# Patient Record
Sex: MALE | Race: WHITE | NOT HISPANIC OR LATINO | Employment: OTHER | URBAN - METROPOLITAN AREA
[De-identification: names, ages, dates, MRNs, and addresses within clinical notes are randomized per-mention and may not be internally consistent; named-entity substitution may affect disease eponyms.]

---

## 2023-12-11 ENCOUNTER — HOSPITAL ENCOUNTER (OUTPATIENT)
Facility: HOSPITAL | Age: 78
Setting detail: OBSERVATION
Discharge: HOME | End: 2023-12-12
Attending: INTERNAL MEDICINE | Admitting: STUDENT IN AN ORGANIZED HEALTH CARE EDUCATION/TRAINING PROGRAM
Payer: MEDICARE

## 2023-12-11 ENCOUNTER — APPOINTMENT (OUTPATIENT)
Dept: RADIOLOGY | Facility: HOSPITAL | Age: 78
End: 2023-12-11
Payer: MEDICARE

## 2023-12-11 DIAGNOSIS — R06.00 ACUTE DYSPNEA: ICD-10-CM

## 2023-12-11 DIAGNOSIS — R42 DIZZINESS: Primary | ICD-10-CM

## 2023-12-11 DIAGNOSIS — R07.9 CHEST PAIN, UNSPECIFIED TYPE: ICD-10-CM

## 2023-12-11 DIAGNOSIS — R55 NEAR SYNCOPE: ICD-10-CM

## 2023-12-11 LAB
ALBUMIN SERPL BCP-MCNC: 4.1 G/DL (ref 3.4–5)
ALP SERPL-CCNC: 122 U/L (ref 33–136)
ALT SERPL W P-5'-P-CCNC: 19 U/L (ref 10–52)
ANION GAP SERPL CALC-SCNC: 14 MMOL/L (ref 10–20)
AST SERPL W P-5'-P-CCNC: 19 U/L (ref 9–39)
BASOPHILS # BLD MANUAL: 0 X10*3/UL (ref 0–0.1)
BASOPHILS NFR BLD MANUAL: 0 %
BILIRUB SERPL-MCNC: 0.6 MG/DL (ref 0–1.2)
BNP SERPL-MCNC: 35 PG/ML (ref 0–99)
BUN SERPL-MCNC: 21 MG/DL (ref 6–23)
CALCIUM SERPL-MCNC: 8.8 MG/DL (ref 8.6–10.3)
CARDIAC TROPONIN I PNL SERPL HS: 4 NG/L (ref 0–20)
CARDIAC TROPONIN I PNL SERPL HS: 5 NG/L (ref 0–20)
CHLORIDE SERPL-SCNC: 103 MMOL/L (ref 98–107)
CO2 SERPL-SCNC: 21 MMOL/L (ref 21–32)
CREAT SERPL-MCNC: 0.92 MG/DL (ref 0.5–1.3)
EOSINOPHIL # BLD MANUAL: 0.1 X10*3/UL (ref 0–0.4)
EOSINOPHIL NFR BLD MANUAL: 2 %
ERYTHROCYTE [DISTWIDTH] IN BLOOD BY AUTOMATED COUNT: 13.8 % (ref 11.5–14.5)
GFR SERPL CREATININE-BSD FRML MDRD: 85 ML/MIN/1.73M*2
GLUCOSE SERPL-MCNC: 168 MG/DL (ref 74–99)
HCT VFR BLD AUTO: 39.1 % (ref 41–52)
HGB BLD-MCNC: 13.1 G/DL (ref 13.5–17.5)
IMM GRANULOCYTES # BLD AUTO: 0.02 X10*3/UL (ref 0–0.5)
IMM GRANULOCYTES NFR BLD AUTO: 0.4 % (ref 0–0.9)
LYMPHOCYTES # BLD MANUAL: 1.56 X10*3/UL (ref 0.8–3)
LYMPHOCYTES NFR BLD MANUAL: 30 %
MCH RBC QN AUTO: 30.9 PG (ref 26–34)
MCHC RBC AUTO-ENTMCNC: 33.5 G/DL (ref 32–36)
MCV RBC AUTO: 92 FL (ref 80–100)
MONOCYTES # BLD MANUAL: 0.31 X10*3/UL (ref 0.05–0.8)
MONOCYTES NFR BLD MANUAL: 6 %
NEUTROPHILS # BLD MANUAL: 3.17 X10*3/UL (ref 1.6–5.5)
NEUTS BAND # BLD MANUAL: 0.21 X10*3/UL (ref 0–0.5)
NEUTS BAND NFR BLD MANUAL: 4 %
NEUTS SEG # BLD MANUAL: 2.96 X10*3/UL (ref 1.6–5)
NEUTS SEG NFR BLD MANUAL: 57 %
NRBC BLD-RTO: 0 /100 WBCS (ref 0–0)
PLATELET # BLD AUTO: 179 X10*3/UL (ref 150–450)
POTASSIUM SERPL-SCNC: 4.3 MMOL/L (ref 3.5–5.3)
PROT SERPL-MCNC: 7 G/DL (ref 6.4–8.2)
RBC # BLD AUTO: 4.24 X10*6/UL (ref 4.5–5.9)
RBC MORPH BLD: NORMAL
SODIUM SERPL-SCNC: 134 MMOL/L (ref 136–145)
TOTAL CELLS COUNTED BLD: 100
VARIANT LYMPHS # BLD MANUAL: 0.05 X10*3/UL (ref 0–0.3)
VARIANT LYMPHS NFR BLD: 1 %
WBC # BLD AUTO: 5.2 X10*3/UL (ref 4.4–11.3)

## 2023-12-11 PROCEDURE — 36415 COLL VENOUS BLD VENIPUNCTURE: CPT | Performed by: PHYSICIAN ASSISTANT

## 2023-12-11 PROCEDURE — 71045 X-RAY EXAM CHEST 1 VIEW: CPT | Mod: FOREIGN READ | Performed by: RADIOLOGY

## 2023-12-11 PROCEDURE — G0378 HOSPITAL OBSERVATION PER HR: HCPCS

## 2023-12-11 PROCEDURE — 85007 BL SMEAR W/DIFF WBC COUNT: CPT | Performed by: PHYSICIAN ASSISTANT

## 2023-12-11 PROCEDURE — 84484 ASSAY OF TROPONIN QUANT: CPT | Performed by: PHYSICIAN ASSISTANT

## 2023-12-11 PROCEDURE — 80053 COMPREHEN METABOLIC PANEL: CPT | Performed by: PHYSICIAN ASSISTANT

## 2023-12-11 PROCEDURE — 2500000001 HC RX 250 WO HCPCS SELF ADMINISTERED DRUGS (ALT 637 FOR MEDICARE OP): Performed by: STUDENT IN AN ORGANIZED HEALTH CARE EDUCATION/TRAINING PROGRAM

## 2023-12-11 PROCEDURE — 99285 EMERGENCY DEPT VISIT HI MDM: CPT | Performed by: INTERNAL MEDICINE

## 2023-12-11 PROCEDURE — 83880 ASSAY OF NATRIURETIC PEPTIDE: CPT | Performed by: INTERNAL MEDICINE

## 2023-12-11 PROCEDURE — 71045 X-RAY EXAM CHEST 1 VIEW: CPT | Mod: FY,FR

## 2023-12-11 PROCEDURE — 85027 COMPLETE CBC AUTOMATED: CPT | Performed by: PHYSICIAN ASSISTANT

## 2023-12-11 PROCEDURE — 99222 1ST HOSP IP/OBS MODERATE 55: CPT | Performed by: STUDENT IN AN ORGANIZED HEALTH CARE EDUCATION/TRAINING PROGRAM

## 2023-12-11 RX ORDER — ASPIRIN 81 MG/1
81 TABLET ORAL NIGHTLY
COMMUNITY
Start: 2020-04-28

## 2023-12-11 RX ORDER — KETOROLAC TROMETHAMINE 5 MG/ML
1 SOLUTION OPHTHALMIC 2 TIMES DAILY
Status: DISCONTINUED | OUTPATIENT
Start: 2023-12-11 | End: 2023-12-12 | Stop reason: HOSPADM

## 2023-12-11 RX ORDER — GABAPENTIN 100 MG/1
100 CAPSULE ORAL 2 TIMES DAILY
Status: DISCONTINUED | OUTPATIENT
Start: 2023-12-11 | End: 2023-12-12 | Stop reason: HOSPADM

## 2023-12-11 RX ORDER — TROSPIUM CHLORIDE 20 MG/1
20 TABLET, FILM COATED ORAL 2 TIMES DAILY
COMMUNITY
Start: 2023-12-04

## 2023-12-11 RX ORDER — ACETAMINOPHEN 325 MG/1
650 TABLET ORAL 2 TIMES DAILY PRN
Status: DISCONTINUED | OUTPATIENT
Start: 2023-12-11 | End: 2023-12-12 | Stop reason: HOSPADM

## 2023-12-11 RX ORDER — PREDNISOLONE ACETATE 10 MG/ML
1 SUSPENSION/ DROPS OPHTHALMIC DAILY
COMMUNITY
Start: 2023-11-28

## 2023-12-11 RX ORDER — ASPIRIN 81 MG/1
81 TABLET ORAL NIGHTLY
Status: DISCONTINUED | OUTPATIENT
Start: 2023-12-12 | End: 2023-12-12 | Stop reason: HOSPADM

## 2023-12-11 RX ORDER — ACETAMINOPHEN 500 MG
3 TABLET ORAL EVERY 12 HOURS
COMMUNITY
End: 2023-12-12 | Stop reason: HOSPADM

## 2023-12-11 RX ORDER — TROSPIUM CHLORIDE 20 MG/1
20 TABLET, FILM COATED ORAL 2 TIMES DAILY
Status: DISCONTINUED | OUTPATIENT
Start: 2023-12-11 | End: 2023-12-12 | Stop reason: CLARIF

## 2023-12-11 RX ORDER — MECLIZINE HCL 12.5 MG 12.5 MG/1
12.5 TABLET ORAL ONCE
Status: DISCONTINUED | OUTPATIENT
Start: 2023-12-11 | End: 2023-12-11

## 2023-12-11 RX ORDER — SENNOSIDES 8.6 MG/1
2 TABLET ORAL 2 TIMES DAILY
Status: DISCONTINUED | OUTPATIENT
Start: 2023-12-11 | End: 2023-12-12 | Stop reason: HOSPADM

## 2023-12-11 RX ORDER — POLYETHYLENE GLYCOL 3350 17 G/17G
17 POWDER, FOR SOLUTION ORAL DAILY
Status: DISCONTINUED | OUTPATIENT
Start: 2023-12-11 | End: 2023-12-12 | Stop reason: HOSPADM

## 2023-12-11 RX ORDER — ENOXAPARIN SODIUM 100 MG/ML
40 INJECTION SUBCUTANEOUS EVERY 24 HOURS
Status: DISCONTINUED | OUTPATIENT
Start: 2023-12-11 | End: 2023-12-12 | Stop reason: HOSPADM

## 2023-12-11 RX ORDER — PREDNISOLONE ACETATE 10 MG/ML
1 SUSPENSION/ DROPS OPHTHALMIC DAILY
Status: DISCONTINUED | OUTPATIENT
Start: 2023-12-11 | End: 2023-12-12 | Stop reason: HOSPADM

## 2023-12-11 RX ORDER — LANOLIN ALCOHOL/MO/W.PET/CERES
1000 CREAM (GRAM) TOPICAL DAILY
Status: DISCONTINUED | OUTPATIENT
Start: 2023-12-11 | End: 2023-12-12 | Stop reason: HOSPADM

## 2023-12-11 RX ORDER — TAMSULOSIN HYDROCHLORIDE 0.4 MG/1
0.4 CAPSULE ORAL DAILY
Status: DISCONTINUED | OUTPATIENT
Start: 2023-12-11 | End: 2023-12-12 | Stop reason: HOSPADM

## 2023-12-11 RX ORDER — GABAPENTIN 100 MG/1
1 CAPSULE ORAL 2 TIMES DAILY
COMMUNITY
Start: 2023-09-13 | End: 2024-01-01

## 2023-12-11 RX ORDER — PRAVASTATIN SODIUM 20 MG/1
20 TABLET ORAL NIGHTLY
Status: DISCONTINUED | OUTPATIENT
Start: 2023-12-11 | End: 2023-12-12 | Stop reason: HOSPADM

## 2023-12-11 RX ORDER — KETOROLAC TROMETHAMINE 5 MG/ML
1 SOLUTION OPHTHALMIC 2 TIMES DAILY
COMMUNITY
Start: 2023-11-28

## 2023-12-11 RX ORDER — TAMSULOSIN HYDROCHLORIDE 0.4 MG/1
0.4 CAPSULE ORAL DAILY
COMMUNITY
Start: 2023-02-22 | End: 2024-03-06

## 2023-12-11 RX ORDER — LANOLIN ALCOHOL/MO/W.PET/CERES
1000 CREAM (GRAM) TOPICAL DAILY
COMMUNITY
Start: 2022-07-07 | End: 2023-12-12 | Stop reason: HOSPADM

## 2023-12-11 RX ORDER — LOVASTATIN 20 MG/1
20 TABLET ORAL NIGHTLY
COMMUNITY
Start: 2015-06-29 | End: 2024-10-19

## 2023-12-11 RX ADMIN — PRAVASTATIN SODIUM 20 MG: 20 TABLET ORAL at 22:07

## 2023-12-11 RX ADMIN — GABAPENTIN 100 MG: 100 CAPSULE ORAL at 22:07

## 2023-12-11 SDOH — SOCIAL STABILITY: SOCIAL INSECURITY: DO YOU FEEL UNSAFE GOING BACK TO THE PLACE WHERE YOU ARE LIVING?: NO

## 2023-12-11 SDOH — SOCIAL STABILITY: SOCIAL INSECURITY: HAS ANYONE EVER THREATENED TO HURT YOUR FAMILY OR YOUR PETS?: NO

## 2023-12-11 SDOH — SOCIAL STABILITY: SOCIAL INSECURITY: HAVE YOU HAD THOUGHTS OF HARMING ANYONE ELSE?: NO

## 2023-12-11 SDOH — SOCIAL STABILITY: SOCIAL INSECURITY: DO YOU FEEL ANYONE HAS EXPLOITED OR TAKEN ADVANTAGE OF YOU FINANCIALLY OR OF YOUR PERSONAL PROPERTY?: NO

## 2023-12-11 SDOH — SOCIAL STABILITY: SOCIAL INSECURITY: ABUSE: ADULT

## 2023-12-11 SDOH — SOCIAL STABILITY: SOCIAL INSECURITY: ARE YOU OR HAVE YOU BEEN THREATENED OR ABUSED PHYSICALLY, EMOTIONALLY, OR SEXUALLY BY ANYONE?: NO

## 2023-12-11 SDOH — SOCIAL STABILITY: SOCIAL INSECURITY: ARE THERE ANY APPARENT SIGNS OF INJURIES/BEHAVIORS THAT COULD BE RELATED TO ABUSE/NEGLECT?: NO

## 2023-12-11 SDOH — SOCIAL STABILITY: SOCIAL INSECURITY: DOES ANYONE TRY TO KEEP YOU FROM HAVING/CONTACTING OTHER FRIENDS OR DOING THINGS OUTSIDE YOUR HOME?: NO

## 2023-12-11 SDOH — SOCIAL STABILITY: SOCIAL INSECURITY: WERE YOU ABLE TO COMPLETE ALL THE BEHAVIORAL HEALTH SCREENINGS?: YES

## 2023-12-11 ASSESSMENT — ACTIVITIES OF DAILY LIVING (ADL)
DRESSING YOURSELF: INDEPENDENT
PATIENT'S MEMORY ADEQUATE TO SAFELY COMPLETE DAILY ACTIVITIES?: YES
ADEQUATE_TO_COMPLETE_ADL: YES
LACK_OF_TRANSPORTATION: NO
BATHING: INDEPENDENT
GROOMING: INDEPENDENT
WALKS IN HOME: INDEPENDENT
HEARING - LEFT EAR: FUNCTIONAL
FEEDING YOURSELF: INDEPENDENT
JUDGMENT_ADEQUATE_SAFELY_COMPLETE_DAILY_ACTIVITIES: YES
TOILETING: INDEPENDENT
HEARING - RIGHT EAR: FUNCTIONAL

## 2023-12-11 ASSESSMENT — LIFESTYLE VARIABLES
HOW OFTEN DO YOU HAVE 6 OR MORE DRINKS ON ONE OCCASION: NEVER
HOW MANY STANDARD DRINKS CONTAINING ALCOHOL DO YOU HAVE ON A TYPICAL DAY: PATIENT DOES NOT DRINK
HOW MANY STANDARD DRINKS CONTAINING ALCOHOL DO YOU HAVE ON A TYPICAL DAY: PATIENT DOES NOT DRINK
AUDIT-C TOTAL SCORE: 0
AUDIT-C TOTAL SCORE: 0
HAVE YOU EVER FELT YOU SHOULD CUT DOWN ON YOUR DRINKING: NO
SKIP TO QUESTIONS 9-10: 1
AUDIT-C TOTAL SCORE: 0
SKIP TO QUESTIONS 9-10: 1
HOW OFTEN DO YOU HAVE A DRINK CONTAINING ALCOHOL: NEVER
EVER HAD A DRINK FIRST THING IN THE MORNING TO STEADY YOUR NERVES TO GET RID OF A HANGOVER: NO
HOW OFTEN DO YOU HAVE A DRINK CONTAINING ALCOHOL: NEVER
SUBSTANCE_ABUSE_PAST_12_MONTHS: NO
REASON UNABLE TO ASSESS: NO
HOW OFTEN DO YOU HAVE 6 OR MORE DRINKS ON ONE OCCASION: NEVER
PRESCIPTION_ABUSE_PAST_12_MONTHS: NO
AUDIT-C TOTAL SCORE: 0
HAVE PEOPLE ANNOYED YOU BY CRITICIZING YOUR DRINKING: NO
EVER FELT BAD OR GUILTY ABOUT YOUR DRINKING: NO

## 2023-12-11 ASSESSMENT — COGNITIVE AND FUNCTIONAL STATUS - GENERAL
DAILY ACTIVITIY SCORE: 24
MOVING TO AND FROM BED TO CHAIR: A LITTLE
DAILY ACTIVITIY SCORE: 24
CLIMB 3 TO 5 STEPS WITH RAILING: A LITTLE
MOBILITY SCORE: 21
WALKING IN HOSPITAL ROOM: A LITTLE
MOBILITY SCORE: 24
PATIENT BASELINE BEDBOUND: NO

## 2023-12-11 ASSESSMENT — PATIENT HEALTH QUESTIONNAIRE - PHQ9
SUM OF ALL RESPONSES TO PHQ9 QUESTIONS 1 & 2: 0
1. LITTLE INTEREST OR PLEASURE IN DOING THINGS: NOT AT ALL
2. FEELING DOWN, DEPRESSED OR HOPELESS: NOT AT ALL
SUM OF ALL RESPONSES TO PHQ9 QUESTIONS 1 & 2: 0
2. FEELING DOWN, DEPRESSED OR HOPELESS: NOT AT ALL
1. LITTLE INTEREST OR PLEASURE IN DOING THINGS: NOT AT ALL

## 2023-12-11 ASSESSMENT — PAIN - FUNCTIONAL ASSESSMENT
PAIN_FUNCTIONAL_ASSESSMENT: 0-10
PAIN_FUNCTIONAL_ASSESSMENT: 0-10

## 2023-12-11 ASSESSMENT — COLUMBIA-SUICIDE SEVERITY RATING SCALE - C-SSRS
1. IN THE PAST MONTH, HAVE YOU WISHED YOU WERE DEAD OR WISHED YOU COULD GO TO SLEEP AND NOT WAKE UP?: NO
6. HAVE YOU EVER DONE ANYTHING, STARTED TO DO ANYTHING, OR PREPARED TO DO ANYTHING TO END YOUR LIFE?: NO
2. HAVE YOU ACTUALLY HAD ANY THOUGHTS OF KILLING YOURSELF?: NO
6. HAVE YOU EVER DONE ANYTHING, STARTED TO DO ANYTHING, OR PREPARED TO DO ANYTHING TO END YOUR LIFE?: NO
1. IN THE PAST MONTH, HAVE YOU WISHED YOU WERE DEAD OR WISHED YOU COULD GO TO SLEEP AND NOT WAKE UP?: NO
2. HAVE YOU ACTUALLY HAD ANY THOUGHTS OF KILLING YOURSELF?: NO

## 2023-12-11 ASSESSMENT — PAIN SCALES - GENERAL
PAINLEVEL_OUTOF10: 0 - NO PAIN
PAINLEVEL_OUTOF10: 0 - NO PAIN

## 2023-12-11 NOTE — ED PROVIDER NOTES
"Limitations to History: none  External Records Reviewed  Independent Historians: none  Social determinants affecting care: none    HPI  Abhishek Mckinley is a 78 y.o. male history of hypertension, hyperlipidemia, diabetes mellitus, neuropathy, who presents to the emergency department for assessment of dizziness today.  He reports that he was shoveling snow this morning and he became short of breath, lightheaded/dizzy, and nauseated.  He reports that he came into the house and try to eat an apple to see if you feel little bit better and he never did.  He denies any chest pains.  He reports that he felt like he was going to pass out and he also felt like the room was spinning around him.  He denies any double or blurred vision.  He denies any new upper or lower extremity numbness, tingling, weakness.  He reports that he is currently on antibiotics for a wound.  He denies any changes to bowel movements or urination.  He has no further complaints.    PMH  No past medical history on file. reviewed by myself.    Meds  Current Outpatient Medications   Medication Instructions    acetaminophen (Tylenol) 500 mg tablet 3 tablets, oral, Every 12 hours    aspirin 81 mg, oral, Nightly    cyanocobalamin (VITAMIN B-12) 1,000 mcg, oral, Daily    gabapentin (Neurontin) 100 mg capsule 1 capsule, oral, 2 times daily    ketorolac (Acular) 0.5 % ophthalmic solution 1 drop, Left Eye, 2 times daily    lovastatin (MEVACOR) 20 mg, oral, Nightly    prednisoLONE acetate (Pred-Forte) 1 % ophthalmic suspension 1 drop, Left Eye, Daily    tamsulosin (FLOMAX) 0.4 mg, oral, Daily    trospium (SANCTURA) 20 mg, oral, 2 times daily       Allergies  No Known Allergies reviewed by myself.    SHx    reviewed by myself.      ------------------------------------------------------------------------------------------------------------------------------------------    BP (!) 153/115   Pulse 78   Temp 36.4 °C (97.5 °F)   Resp 17   Ht 1.651 m (5' 5\")   Wt 74.8 " kg (165 lb)   SpO2 94%   BMI 27.46 kg/m²     Physical Exam  Vitals and nursing note reviewed.   Constitutional:       General: He is not in acute distress.     Appearance: Normal appearance. He is normal weight. He is not ill-appearing or toxic-appearing.   HENT:      Head: Normocephalic.      Nose: Nose normal.      Mouth/Throat:      Mouth: Mucous membranes are moist.   Eyes:      Extraocular Movements: Extraocular movements intact.      Conjunctiva/sclera: Conjunctivae normal.   Cardiovascular:      Rate and Rhythm: Normal rate and regular rhythm.      Pulses:           Radial pulses are 2+ on the right side and 2+ on the left side.        Dorsalis pedis pulses are 2+ on the right side and 2+ on the left side.   Pulmonary:      Effort: Pulmonary effort is normal.      Breath sounds: Normal breath sounds.   Abdominal:      General: Abdomen is flat. Bowel sounds are normal.      Palpations: Abdomen is soft.      Tenderness: There is no abdominal tenderness.   Musculoskeletal:         General: Normal range of motion.      Cervical back: Neck supple.      Right lower leg: No edema.      Left lower leg: No edema.   Skin:     General: Skin is warm and dry.   Neurological:      General: No focal deficit present.      Mental Status: He is alert and oriented to person, place, and time.      Cranial Nerves: Cranial nerves 2-12 are intact.      Sensory: Sensation is intact.      Motor: Motor function is intact.      Coordination: Coordination is intact.      Comments: NIH is 0.   Psychiatric:         Attention and Perception: Attention normal.         Mood and Affect: Mood normal.          ------------------------------------------------------------------------------------------------------------------------------------------  Imaging  XR chest 1 view   Final Result   No regions of airspace consolidation.   Signed by Tonny Bauer MD           Labs  Labs Reviewed   CBC WITH AUTO DIFFERENTIAL - Abnormal       Result Value     WBC 5.2      nRBC 0.0      RBC 4.24 (*)     Hemoglobin 13.1 (*)     Hematocrit 39.1 (*)     MCV 92      MCH 30.9      MCHC 33.5      RDW 13.8      Platelets 179      Immature Granulocytes %, Automated 0.4      Immature Granulocytes Absolute, Automated 0.02      Narrative:     The previously reported component Neutrophils % is no longer being reported.  The previously reported component Lymphocytes % is no longer being reported.  The previously reported component Monocytes % is no longer being reported.  The previously   reported component Eosinophils % is no longer being reported.  The previously reported component Basophils % is no longer being reported.  The previously reported component Absolute Neutrophils is no longer being reported.  The previously reported   component Absolute Lymphocytes is no longer being reported.  The previously reported component Absolute Monocytes is no longer being reported.  The previously reported component Absolute Eosinophils is no longer being reported.  The previously reported   component Absolute Basophils is no longer being reported.   COMPREHENSIVE METABOLIC PANEL - Abnormal    Glucose 168 (*)     Sodium 134 (*)     Potassium 4.3      Chloride 103      Bicarbonate 21      Anion Gap 14      Urea Nitrogen 21      Creatinine 0.92      eGFR 85      Calcium 8.8      Albumin 4.1      Alkaline Phosphatase 122      Total Protein 7.0      AST 19      Bilirubin, Total 0.6      ALT 19     TROPONIN I, HIGH SENSITIVITY - Normal    Troponin I, High Sensitivity 4      Narrative:     Less than 99th percentile of normal range cutoff-  Female and children under 18 years old <14 ng/L; Male <21 ng/L: Negative  Repeat testing should be performed if clinically indicated.     Female and children under 18 years old 14-50 ng/L; Male 21-50 ng/L:  Consistent with possible cardiac damage and possible increased clinical   risk. Serial measurements may help to assess extent of myocardial damage.     >50  ng/L: Consistent with cardiac damage, increased clinical risk and  myocardial infarction. Serial measurements may help assess extent of   myocardial damage.      NOTE: Children less than 1 year old may have higher baseline troponin   levels and results should be interpreted in conjunction with the overall   clinical context.     NOTE: Troponin I testing is performed using a different   testing methodology at [facility] than at other   [facility]. Direct result comparisons should only   be made within the same method.   B-TYPE NATRIURETIC PEPTIDE - Normal    BNP 35      Narrative:        <100 pg/mL - Heart failure unlikely  100-299 pg/mL - Intermediate probability of acute heart                  failure exacerbation. Correlate with clinical                  context and patient history.    >=300 pg/mL - Heart Failure likely. Correlate with clinical                  context and patient history.    BNP testing is performed using different testing methodology at [facility] than at other [facility]. Direct result comparisons should only be made within the same method.      TROPONIN I, HIGH SENSITIVITY - Normal    Troponin I, High Sensitivity 5      Narrative:     Less than 99th percentile of normal range cutoff-  Female and children under 18 years old <14 ng/L; Male <21 ng/L: Negative  Repeat testing should be performed if clinically indicated.     Female and children under 18 years old 14-50 ng/L; Male 21-50 ng/L:  Consistent with possible cardiac damage and possible increased clinical   risk. Serial measurements may help to assess extent of myocardial damage.     >50 ng/L: Consistent with cardiac damage, increased clinical risk and  myocardial infarction. Serial measurements may help assess extent of   myocardial damage.      NOTE: Children less than 1 year old may have higher baseline troponin   levels and results should be interpreted in conjunction with the overall   clinical  context.     NOTE: Troponin I testing is performed using a different   testing methodology at [facility] than at other   [facility] hospitals. Direct result comparisons should only   be made within the same method.   MANUAL DIFFERENTIAL    Neutrophils %, Manual 57.0      Bands %, Manual 4.0      Lymphocytes %, Manual 30.0      Monocytes %, Manual 6.0      Eosinophils %, Manual 2.0      Basophils %, Manual 0.0      Atypical Lymphocytes %, Manual 1.0      Seg Neutrophils Absolute, Manual 2.96      Bands Absolute, Manual 0.21      Lymphocytes Absolute, Manual 1.56      Monocytes Absolute, Manual 0.31      Eosinophils Absolute, Manual 0.10      Basophils Absolute, Manual 0.00      Atypical Lymphs Absolute, Manual 0.05      Total Cells Counted 100      Neutrophils Absolute, Manual 3.17      RBC Morphology No significant RBC morphology present          ED Course  ED Course as of 12/11/23 1139   Mon Dec 11, 2023   0948 Reevaluated patient.  Patient is feeling better although nauseated when sitting up.  Patient states he had a room spinning sensation this morning while shoveling.  Patient wishes to use restroom.  Patient is ambulatory with a steady gait to the restroom at this time.  Patient denies chest pain or shortness of breath at this time. [JA]      ED Course User Index  [JA] Levi Humphrey DO         Diagnoses as of 12/11/23 1139   Dizziness   Near syncope   Acute dyspnea        Medical Decision Making: He did not appear ill or toxic.  Vital signs reviewed.  He is hypertensive.  Otherwise hemodynamically stable.  He is placed in a continuous cardiac and pulse ox monitor.    Differential diagnoses considered: Electrolyte abnormalities, ACS, CVA, vertigo, cardiac arrhythmia, others    EKG interpreted by myself: Normal sinus rhythm.  Ventricular rate 71 bpm.  No acute ST elevations or depressions.  PVC noted.    I reviewed the labs from today.  No leukocytosis or leukopenia.  Hemoglobin 13.1 with hematocrit  of 39.1.  Platelets normal.  BNP normal.  Glucose 168.  Sodium 134.  Troponin x 2 negative.  Chest x-ray negative as read by the radiologist.  Case discussed and evaluated with ED attending, Dr. Humphrey who is agreeable to patient plan of care.  He did order the patient meclizine however his dizziness did seem to improve prior to receiving this.  I discussed with the patient about the workup today.  Unclear exactly what happened today.  He could have had an episode of vertigo or could have had cardiac arrhythmia.  I do recommend admission for observation.  He is agreeable.  I consulted general medicine on-call.  I spoke with the hospitalist team, Dr. Higuera will admit to telemetry.    Diagnosis: Presyncope, dyspnea, dizziness  Plan: Admit           Samuel Javier PA-C  12/11/23 1131

## 2023-12-11 NOTE — ED TRIAGE NOTES
Pt bib ems for dizziness after shoveling his driveway this morning. Pt has hx of type 2 diabetes. Pt stated he took 4 81mg asa.

## 2023-12-11 NOTE — PROGRESS NOTES
Pharmacy Medication History Review    Abhishek Mckinley is a 78 y.o. male admitted for No Principal Problem: There is no principal problem currently on the Problem List. Please update the Problem List and refresh.. Pharmacy reviewed the patient's [id number] medications and allergies for accuracy.    The list below reflectives the updated PTA list. Please review each medication in order reconciliation for additional clarification and justification.  (Not in a hospital admission)       The list below reflectives the updated allergy list. Please review each documented allergy for additional clarification and justification.  Allergies  Reviewed by Sheri Duenas CPhT on 12/11/2023   No Known Allergies         Below are additional concerns with the patient's PTA list.    See PTA med list    Sheri Duenas CPhT

## 2023-12-11 NOTE — H&P
"Internal Medicine History and Physical   PATIENT NAME: Abhishek Mckinley  MRN: [id number]  DATE: 12/11/2023       Subjective   Abhishek Mckinley is a 78 y.o. male who presented to [facility] with transient chest pain, SOB, dizziness.  Patient states he exercises around 1/2-hour every morning however today after his exercises he shoveled some snow and subsequently experienced shortness of breath on exertion, unsure if any chest pain present and transient dizziness which has since resolved.  Patient was concerned regarding his symptoms and decided to present to the emergency room, he did take 325mg Aspirin prior to arrival.  Upon questioning patient is unable to discern exactly his chest pain if it was positional, pleuritic, currently chest pain-free and nonreproducible to palpation he did take his blood sugar during the event which she tells me was 119.  Currently patient is asymptomatic.  In the ER, vital signs stable patient is nontoxic-appearing laboratory work glucose 168, sodium 134 hemoglobin 13.1, troponin negative x 2.  Patient will be admitted to the medicine service.  This time with cardiology consult for chest pain rule out     PMHx notable for DM type II (follows with endocrinology was previously on metformin however has been weaned and currently controlled with diet and exercise) (neuropathy currently being worked up by neurology and rheumatology managed with gabapentin), hyperlipidemia, history of prostate cancer completed treatment follows with urology    A 10 point ROS was completed and is negative expect as stated in HPI.    PMHx: As above  PSHx: No past surgical history on file.  Social Hx: no etoh tobacco or illicit   Fam Hx: reviewed        Objective   BP (!) 153/115   Pulse 78   Temp 36.4 °C (97.5 °F)   Resp 17   Ht 1.651 m (5' 5\")   Wt 74.8 kg (165 lb)   SpO2 94%   BMI 27.46 kg/m²     General: Pleasant and cooperative  HEENT: Normocephalic, atraumatic, mucus membranes moist.  Eyes: EOMI  Neck:  " Trachea midline.    Chest: Symmetric chest expansion, CTA bilaterally   CV:  Regular rate, regular rhythm.  Positive S1/S2.  Abdomen: soft, non-tender, non-distended, no guarding or peritoneal signs   Extremities:  No lower extremity edema  Neurological:  no obvious focal deficits   Psych: appropriate affect and behavior   Skin:  Warm and dry       Assessment / Plan   78-year-old male who presents to [facility] transient shortness of breath, dizziness and chest pain which have since resolved.    # Chest pain, ACS rule out-currently asymptomatic troponin negative x 2    #DM type II (follows with endocrinology was previously on metformin however has been weaned and currently controlled with diet and exercise)   #neuropathy currently being worked up by neurology and rheumatology managed with gabapentin, hyperlipidemia, history of prostate cancer completed treatment follows with urology    Admit to telemetry, obtain echocardiogram trend troponin, EKG patient asymptomatic at this time.  Patient took 325 mg aspirin prior to arrival  Continue home medications as appropriate  Full code confirmed on admission       Last BM date    DVT Prophylaxis: Lovenox      Discharge Planning Interim:  Follow up with PCP   Consider outpatient follow up with GI/pcp for mild anemia workup outpatient

## 2023-12-12 ENCOUNTER — APPOINTMENT (OUTPATIENT)
Dept: CARDIOLOGY | Facility: HOSPITAL | Age: 78
End: 2023-12-12
Payer: MEDICARE

## 2023-12-12 VITALS
RESPIRATION RATE: 16 BRPM | BODY MASS INDEX: 27.49 KG/M2 | OXYGEN SATURATION: 96 % | HEART RATE: 64 BPM | SYSTOLIC BLOOD PRESSURE: 134 MMHG | HEIGHT: 65 IN | DIASTOLIC BLOOD PRESSURE: 80 MMHG | WEIGHT: 165 LBS | TEMPERATURE: 97.7 F

## 2023-12-12 LAB
ALBUMIN SERPL BCP-MCNC: 4.2 G/DL (ref 3.4–5)
ANION GAP SERPL CALC-SCNC: 13 MMOL/L (ref 10–20)
AORTIC VALVE MEAN GRADIENT: 9
AORTIC VALVE PEAK VELOCITY: 2.07
AV PEAK GRADIENT: 17.1
AVA (PEAK VEL): 1.81
AVA (VTI): 1.92
BUN SERPL-MCNC: 20 MG/DL (ref 6–23)
CALCIUM SERPL-MCNC: 9.3 MG/DL (ref 8.6–10.3)
CHLORIDE SERPL-SCNC: 104 MMOL/L (ref 98–107)
CO2 SERPL-SCNC: 24 MMOL/L (ref 21–32)
CREAT SERPL-MCNC: 0.87 MG/DL (ref 0.5–1.3)
EJECTION FRACTION APICAL 4 CHAMBER: 60.7
EJECTION FRACTION: 66
ERYTHROCYTE [DISTWIDTH] IN BLOOD BY AUTOMATED COUNT: 13.5 % (ref 11.5–14.5)
GFR SERPL CREATININE-BSD FRML MDRD: 88 ML/MIN/1.73M*2
GLUCOSE SERPL-MCNC: 146 MG/DL (ref 74–99)
HCT VFR BLD AUTO: 39.5 % (ref 41–52)
HGB BLD-MCNC: 12.7 G/DL (ref 13.5–17.5)
LEFT ATRIUM VOLUME AREA LENGTH INDEX BSA: 21.1
LEFT VENTRICLE INTERNAL DIMENSION DIASTOLE: 4.19 (ref 3.5–6)
LEFT VENTRICULAR OUTFLOW TRACT DIAMETER: 1.9
MCH RBC QN AUTO: 29.8 PG (ref 26–34)
MCHC RBC AUTO-ENTMCNC: 32.2 G/DL (ref 32–36)
MCV RBC AUTO: 93 FL (ref 80–100)
MITRAL VALVE E/A RATIO: 0.91
NRBC BLD-RTO: 0 /100 WBCS (ref 0–0)
PHOSPHATE SERPL-MCNC: 3.6 MG/DL (ref 2.5–4.9)
PLATELET # BLD AUTO: 205 X10*3/UL (ref 150–450)
POTASSIUM SERPL-SCNC: 4.5 MMOL/L (ref 3.5–5.3)
RBC # BLD AUTO: 4.26 X10*6/UL (ref 4.5–5.9)
RIGHT VENTRICLE FREE WALL PEAK S': 17.5
SODIUM SERPL-SCNC: 136 MMOL/L (ref 136–145)
TRICUSPID ANNULAR PLANE SYSTOLIC EXCURSION: 2.2
WBC # BLD AUTO: 5.8 X10*3/UL (ref 4.4–11.3)

## 2023-12-12 PROCEDURE — 93005 ELECTROCARDIOGRAM TRACING: CPT

## 2023-12-12 PROCEDURE — 2500000004 HC RX 250 GENERAL PHARMACY W/ HCPCS (ALT 636 FOR OP/ED): Mod: MUE | Performed by: INTERNAL MEDICINE

## 2023-12-12 PROCEDURE — 96372 THER/PROPH/DIAG INJ SC/IM: CPT | Performed by: STUDENT IN AN ORGANIZED HEALTH CARE EDUCATION/TRAINING PROGRAM

## 2023-12-12 PROCEDURE — 85027 COMPLETE CBC AUTOMATED: CPT | Performed by: STUDENT IN AN ORGANIZED HEALTH CARE EDUCATION/TRAINING PROGRAM

## 2023-12-12 PROCEDURE — 99223 1ST HOSP IP/OBS HIGH 75: CPT | Performed by: STUDENT IN AN ORGANIZED HEALTH CARE EDUCATION/TRAINING PROGRAM

## 2023-12-12 PROCEDURE — G0378 HOSPITAL OBSERVATION PER HR: HCPCS

## 2023-12-12 PROCEDURE — 2500000004 HC RX 250 GENERAL PHARMACY W/ HCPCS (ALT 636 FOR OP/ED): Performed by: STUDENT IN AN ORGANIZED HEALTH CARE EDUCATION/TRAINING PROGRAM

## 2023-12-12 PROCEDURE — 36415 COLL VENOUS BLD VENIPUNCTURE: CPT | Performed by: STUDENT IN AN ORGANIZED HEALTH CARE EDUCATION/TRAINING PROGRAM

## 2023-12-12 PROCEDURE — 2500000001 HC RX 250 WO HCPCS SELF ADMINISTERED DRUGS (ALT 637 FOR MEDICARE OP): Performed by: STUDENT IN AN ORGANIZED HEALTH CARE EDUCATION/TRAINING PROGRAM

## 2023-12-12 PROCEDURE — 84100 ASSAY OF PHOSPHORUS: CPT | Performed by: STUDENT IN AN ORGANIZED HEALTH CARE EDUCATION/TRAINING PROGRAM

## 2023-12-12 PROCEDURE — 93306 TTE W/DOPPLER COMPLETE: CPT | Performed by: STUDENT IN AN ORGANIZED HEALTH CARE EDUCATION/TRAINING PROGRAM

## 2023-12-12 PROCEDURE — 93306 TTE W/DOPPLER COMPLETE: CPT

## 2023-12-12 PROCEDURE — 99238 HOSP IP/OBS DSCHRG MGMT 30/<: CPT | Performed by: INTERNAL MEDICINE

## 2023-12-12 RX ORDER — OXYBUTYNIN CHLORIDE 5 MG/1
5 TABLET ORAL 3 TIMES DAILY
Status: DISCONTINUED | OUTPATIENT
Start: 2023-12-12 | End: 2023-12-12 | Stop reason: HOSPADM

## 2023-12-12 RX ADMIN — ENOXAPARIN SODIUM 40 MG: 40 INJECTION SUBCUTANEOUS at 09:22

## 2023-12-12 RX ADMIN — KETOROLAC TROMETHAMINE 1 DROP: 5 SOLUTION OPHTHALMIC at 09:23

## 2023-12-12 RX ADMIN — GABAPENTIN 100 MG: 100 CAPSULE ORAL at 09:22

## 2023-12-12 RX ADMIN — OXYBUTYNIN CHLORIDE 5 MG: 5 TABLET ORAL at 14:59

## 2023-12-12 RX ADMIN — CYANOCOBALAMIN TAB 1000 MCG 1000 MCG: 1000 TAB at 09:23

## 2023-12-12 RX ADMIN — SENNOSIDES 17.2 MG: 8.6 TABLET, FILM COATED ORAL at 09:22

## 2023-12-12 RX ADMIN — PREDNISOLONE ACETATE 1 DROP: 10 SUSPENSION/ DROPS OPHTHALMIC at 09:23

## 2023-12-12 RX ADMIN — TAMSULOSIN HYDROCHLORIDE 0.4 MG: 0.4 CAPSULE ORAL at 09:23

## 2023-12-12 RX ADMIN — OXYBUTYNIN CHLORIDE 5 MG: 5 TABLET ORAL at 10:33

## 2023-12-12 ASSESSMENT — PAIN SCALES - GENERAL: PAINLEVEL_OUTOF10: 0 - NO PAIN

## 2023-12-12 NOTE — PROGRESS NOTES
12/12/23 1449   Discharge Planning   Living Arrangements Alone   Support Systems Family members   Type of Residence Private residence   Who is requesting discharge planning? Provider   Patient expects to be discharged to: home     Pt admitted for sob, pt very active.  Pt is ambulatory, independent, plan is for home at discharge.  Evelyn Brothers RN TCC

## 2023-12-12 NOTE — CARE PLAN
The patient's goals for the shift include  be comfortable and remain free of falls     The clinical goals for the shift include Pt will remain free and comfortable during shift

## 2023-12-12 NOTE — DISCHARGE SUMMARY
Discharge Diagnosis  Dizziness    Issues Requiring Follow-Up  Outpatient stress test.    Discharge Meds     Your medication list        ASK your doctor about these medications        Instructions Last Dose Given Next Dose Due   acetaminophen 500 mg tablet  Commonly known as: Tylenol           aspirin 81 mg EC tablet           cyanocobalamin 1,000 mcg tablet  Commonly known as: Vitamin B-12           gabapentin 100 mg capsule  Commonly known as: Neurontin           ketorolac 0.5 % ophthalmic solution  Commonly known as: Acular           lovastatin 20 mg tablet  Commonly known as: Mevacor           prednisoLONE acetate 1 % ophthalmic suspension  Commonly known as: Pred-Forte           tamsulosin 0.4 mg 24 hr capsule  Commonly known as: Flomax           trospium 20 mg tablet  Commonly known as: Sanctura                    Test Results Pending At Discharge  Pending Labs       No current pending labs.            Hospital Course  Abhishek Mckinley is a 78 y.o. male who presented to [facility] with transient chest pain, SOB, dizziness.  Patient states he exercises around 1/2-hour every morning however today after his exercises he shoveled some snow and subsequently experienced shortness of breath on exertion, unsure if any chest pain present and transient dizziness which has since resolved.  Patient remained chest pain free and troponin negative x 2 and cardiology cleared for discharge.  Recommend outpatient stress test.  Stable for dc without any medicine changes.     Pertinent Physical Exam At Time of Discharge  Physical Exam  Vitals reviewed.   Constitutional:       Appearance: Normal appearance.   HENT:      Head: Normocephalic and atraumatic.      Mouth/Throat:      Mouth: Mucous membranes are moist.   Eyes:      Conjunctiva/sclera: Conjunctivae normal.   Cardiovascular:      Rate and Rhythm: Normal rate.      Pulses: Normal pulses.   Pulmonary:      Effort: Pulmonary effort is normal.      Breath sounds: Normal breath  sounds. No wheezing.   Abdominal:      General: Abdomen is flat. There is no distension.      Palpations: Abdomen is soft.      Tenderness: There is no guarding.   Musculoskeletal:         General: No swelling. Normal range of motion.   Skin:     General: Skin is warm and dry.   Neurological:      General: No focal deficit present.      Mental Status: He is alert. Mental status is at baseline.   Psychiatric:         Mood and Affect: Mood normal.         Behavior: Behavior normal.         Outpatient Follow-Up  No future appointments.  PCP 1 week      Emmanuel Garcia MD

## 2023-12-12 NOTE — PROGRESS NOTES
PHARMACY FORMULARY THERAPEUTIC INTERCHANGE:   Tropism 20mg P BID was changed per [employer] formulary therapeutic interchange to oxybutynin IR 5mg PO TID     Violetta CondeD, BCPS   12/12/2023 10:25 AM

## 2023-12-12 NOTE — CONSULTS
Inpatient consult to Cardiology  Consult performed by: Aric Arenas MD PhD  Consult ordered by: Wilfrid Higuera DO  Reason for consult: Chest pain        History Of Present Illness:    Abhishek Mckinley is a 78 y.o. male who presented to [facility] with transient chest pain, SOB, dizziness.  Patient states he exercises around 1/2-hour every morning however today after his exercises he shoveled some snow and subsequently experienced shortness of breath on exertion, unsure if any chest pain present and transient dizziness which has since resolved.  Patient was concerned regarding his symptoms and decided to present to the emergency room, he did take 325mg Aspirin prior to arrival.  Upon questioning patient is unable to discern exactly his chest pain if it was positional, pleuritic, currently chest pain-free and nonreproducible to palpation he did take his blood sugar during the event which she tells me was 119.  Currently patient is asymptomatic.  In the ER, vital signs stable patient is nontoxic-appearing laboratory work glucose 168, sodium 134 hemoglobin 13.1, troponin negative x 2.  Patient will be admitted to the medicine service.  This time with cardiology consult for chest pain rule out      PMHx notable for DM type II (follows with endocrinology was previously on metformin however has been weaned and currently controlled with diet and exercise) (neuropathy currently being worked up by neurology and rheumatology managed with gabapentin), hyperlipidemia, history of prostate cancer completed treatment follows with urology     A 10 point ROS was completed and is negative expect as stated in HPI.     PMHx: As above  PSHx:   Surgical History   No past surgical history on file.     Social Hx: no etoh tobacco or illicit   Fam Hx: reviewed          Objective     General: Pleasant and cooperative  HEENT: Normocephalic, atraumatic, mucus membranes moist.  Eyes: EOMI  Neck:  Trachea midline.    Chest: Symmetric chest  expansion, CTA bilaterally   CV:  Regular rate, regular rhythm.  Positive S1/S2.  Abdomen: soft, non-tender, non-distended, no guarding or peritoneal signs   Extremities:  No lower extremity edema  Neurological:  no obvious focal deficits   Psych: appropriate affect and behavior   Skin:  Warm and dry        Last Recorded Vitals:  Vitals:    12/12/23 0003 12/12/23 0459 12/12/23 0901 12/12/23 1205   BP: 147/72 139/82 148/77 134/80   Patient Position:    Lying   Pulse: 63 63 65 64   Resp: 14 12  16   Temp: 36.5 °C (97.7 °F) 36.6 °C (97.9 °F) 36.5 °C (97.7 °F) 36.5 °C (97.7 °F)   TempSrc: Temporal Temporal  Temporal   SpO2: 95% 97% 94% 96%   Weight:       Height:           Last Labs:  CBC - 12/12/2023:  5:15 AM  5.8 12.7 205    39.5      CMP - 12/12/2023:  5:15 AM  9.3 7.0 19 --- 0.6   3.6 4.2 19 122      PTT - No results in last year.  _   _ _     Troponin I, High Sensitivity   Date/Time Value Ref Range Status   12/11/2023 10:07 AM 5 0 - 20 ng/L Final   12/11/2023 08:40 AM 4 0 - 20 ng/L Final     BNP   Date/Time Value Ref Range Status   12/11/2023 08:40 AM 35 0 - 99 pg/mL Final     Hemoglobin A1C   Date/Time Value Ref Range Status   08/16/2023 10:10 AM 6.9 (H) 4.0 - 5.6 % Final   01/30/2023 08:26 AM 7.6 (H) 4.0 - 5.6 % Final      Last I/O:  No intake/output data recorded.    Past Cardiology Tests (Last 3 Years):  EKG:  ECG 12 lead 12/12/2023 (Preliminary)    Echo:  Transthoracic Echo (TTE) Complete 12/12/2023    Ejection Fractions:  EF   Date/Time Value Ref Range Status   12/12/2023 08:52 AM 66       Cath:  No results found for this or any previous visit from the past 1095 days.    Stress Test:  No results found for this or any previous visit from the past 1095 days.    Cardiac Imaging:  No results found for this or any previous visit from the past 1095 days.      Past Medical History:  He has no past medical history on file.    Past Surgical History:  He has no past surgical history on file.      Social History:  He  reports that he has never smoked. He has never used smokeless tobacco. No history on file for alcohol use and drug use.    Family History:  No family history on file.     Allergies:  Patient has no known allergies.    Inpatient Medications:  Scheduled medications   Medication Dose Route Frequency    aspirin  81 mg oral Nightly    cyanocobalamin  1,000 mcg oral Daily    enoxaparin  40 mg subcutaneous q24h    gabapentin  100 mg oral BID    ketorolac  1 drop Left Eye BID    oxybutynin  5 mg oral TID    perflutren lipid microspheres  0.5-10 mL of dilution intravenous Once in imaging    perflutren protein A microsphere  0.5 mL intravenous Once in imaging    polyethylene glycol  17 g oral Daily    pravastatin  20 mg oral Nightly    prednisoLONE acetate  1 drop Left Eye Daily    sennosides  2 tablet oral BID    tamsulosin  0.4 mg oral Daily     PRN medications   Medication    acetaminophen     Continuous Medications   Medication Dose Last Rate     Outpatient Medications:  Current Outpatient Medications   Medication Instructions    acetaminophen (Tylenol) 500 mg tablet 3 tablets, oral, Every 12 hours    aspirin 81 mg, oral, Nightly    cyanocobalamin (VITAMIN B-12) 1,000 mcg, oral, Daily    gabapentin (Neurontin) 100 mg capsule 1 capsule, oral, 2 times daily    ketorolac (Acular) 0.5 % ophthalmic solution 1 drop, Left Eye, 2 times daily    lovastatin (MEVACOR) 20 mg, oral, Nightly    prednisoLONE acetate (Pred-Forte) 1 % ophthalmic suspension 1 drop, Left Eye, Daily    tamsulosin (FLOMAX) 0.4 mg, oral, Daily    trospium (SANCTURA) 20 mg, oral, 2 times daily        Assessment/Plan   Chest pain  Diabetes mellitus type 2  Hyperlipidemia  Neuropathy  Abnormal EKG  PVCs      Patient presented with dizziness after shoveling is no and improved after rest.  Denies having chest pain.  Troponin was negative.  EKG shows sinus rhythm with some PVCs and no significant ST-T changes.  He tries to manage his diabetes with diet and exercise.   Last A1c at 6.9.  He was found to have some vascular calcification renal arteries and hence has atherosclerosis by definition.  At this point I believe patient would benefit from baby aspirin and statin.  I suggest to intensify his statin to at least Lipitor 40 mg daily.  Patient is in agreement with that.  I discussed different options with the patient regarding evaluation for any possible underlying obstructive coronary artery disease including stress test and inpatient versus outpatient setting.  Patient is adamant that he would like to discuss this with his primary care physician and will have a stress test done at [facility] by his primary care physician.  I asked the patient to seek emergent medical care if he develops any significant chest pain meanwhile.  We also discussed importance of heart healthy diet and continuation of exercise and activity.  Okay to discharge from cardiac standpoint with above instructions and follow-up.        I appreciate the opportunity to participate in this patient's care.      Available on Haiku or please call if necessary: [phone].     Aric Arenas MD, PhD, [facility], [facility]  Interventional Cardiology, [facility]  Associate Professor of Medicine, [facility]  [email or web address]   Office:       Macro dragon disclaimer: This note was dictated by speech recognition. Minor errors in transcription may be present.

## 2023-12-14 LAB
ATRIAL RATE: 71 BPM
P AXIS: 37 DEGREES
PR INTERVAL: 213 MS
Q ONSET: 253 MS
QRS COUNT: 11 BEATS
QRS DURATION: 95 MS
QT INTERVAL: 391 MS
QTC CALCULATION(BAZETT): 425 MS
QTC FREDERICIA: 413 MS
R AXIS: -13 DEGREES
T AXIS: 39 DEGREES
T OFFSET: 448 MS
VENTRICULAR RATE: 71 BPM

## 2024-04-30 ENCOUNTER — APPOINTMENT (OUTPATIENT)
Dept: UROLOGY | Facility: HOSPITAL | Age: 79
End: 2024-04-30
Payer: MEDICARE

## 2025-02-09 ENCOUNTER — APPOINTMENT (OUTPATIENT)
Dept: RADIOLOGY | Facility: HOSPITAL | Age: 80
End: 2025-02-09
Payer: MEDICARE

## 2025-02-09 ENCOUNTER — APPOINTMENT (OUTPATIENT)
Dept: CARDIOLOGY | Facility: HOSPITAL | Age: 80
End: 2025-02-09
Payer: MEDICARE

## 2025-02-09 ENCOUNTER — HOSPITAL ENCOUNTER (EMERGENCY)
Facility: HOSPITAL | Age: 80
Discharge: HOME | End: 2025-02-09
Attending: EMERGENCY MEDICINE
Payer: MEDICARE

## 2025-02-09 VITALS
TEMPERATURE: 97.3 F | BODY MASS INDEX: 28.45 KG/M2 | HEIGHT: 66 IN | SYSTOLIC BLOOD PRESSURE: 142 MMHG | RESPIRATION RATE: 16 BRPM | DIASTOLIC BLOOD PRESSURE: 73 MMHG | WEIGHT: 177 LBS | HEART RATE: 67 BPM | OXYGEN SATURATION: 96 %

## 2025-02-09 DIAGNOSIS — R42 LIGHTHEADEDNESS: ICD-10-CM

## 2025-02-09 DIAGNOSIS — R42 DIZZINESS: Primary | ICD-10-CM

## 2025-02-09 LAB
ALBUMIN SERPL BCP-MCNC: 4 G/DL (ref 3.4–5)
ALP SERPL-CCNC: 102 U/L (ref 33–136)
ALT SERPL W P-5'-P-CCNC: 21 U/L (ref 10–52)
ANION GAP SERPL CALC-SCNC: 13 MMOL/L (ref 10–20)
AST SERPL W P-5'-P-CCNC: 16 U/L (ref 9–39)
BASOPHILS # BLD AUTO: 0.02 X10*3/UL (ref 0–0.1)
BASOPHILS NFR BLD AUTO: 0.4 %
BILIRUB DIRECT SERPL-MCNC: 0.1 MG/DL (ref 0–0.3)
BILIRUB SERPL-MCNC: 0.9 MG/DL (ref 0–1.2)
BUN SERPL-MCNC: 21 MG/DL (ref 6–23)
CALCIUM SERPL-MCNC: 8.7 MG/DL (ref 8.6–10.3)
CARDIAC TROPONIN I PNL SERPL HS: 5 NG/L (ref 0–20)
CARDIAC TROPONIN I PNL SERPL HS: 5 NG/L (ref 0–20)
CHLORIDE SERPL-SCNC: 101 MMOL/L (ref 98–107)
CO2 SERPL-SCNC: 23 MMOL/L (ref 21–32)
CREAT SERPL-MCNC: 0.74 MG/DL (ref 0.5–1.3)
EGFRCR SERPLBLD CKD-EPI 2021: >90 ML/MIN/1.73M*2
EOSINOPHIL # BLD AUTO: 0.1 X10*3/UL (ref 0–0.4)
EOSINOPHIL NFR BLD AUTO: 2.2 %
ERYTHROCYTE [DISTWIDTH] IN BLOOD BY AUTOMATED COUNT: 12.9 % (ref 11.5–14.5)
GLUCOSE SERPL-MCNC: 234 MG/DL (ref 74–99)
HCT VFR BLD AUTO: 40.1 % (ref 41–52)
HGB BLD-MCNC: 13.5 G/DL (ref 13.5–17.5)
IMM GRANULOCYTES # BLD AUTO: 0.02 X10*3/UL (ref 0–0.5)
IMM GRANULOCYTES NFR BLD AUTO: 0.4 % (ref 0–0.9)
LYMPHOCYTES # BLD AUTO: 1.2 X10*3/UL (ref 0.8–3)
LYMPHOCYTES NFR BLD AUTO: 26.8 %
MAGNESIUM SERPL-MCNC: 1.63 MG/DL (ref 1.6–2.4)
MCH RBC QN AUTO: 30.6 PG (ref 26–34)
MCHC RBC AUTO-ENTMCNC: 33.7 G/DL (ref 32–36)
MCV RBC AUTO: 91 FL (ref 80–100)
MONOCYTES # BLD AUTO: 0.41 X10*3/UL (ref 0.05–0.8)
MONOCYTES NFR BLD AUTO: 9.2 %
NEUTROPHILS # BLD AUTO: 2.72 X10*3/UL (ref 1.6–5.5)
NEUTROPHILS NFR BLD AUTO: 61 %
NRBC BLD-RTO: 0 /100 WBCS (ref 0–0)
PLATELET # BLD AUTO: 147 X10*3/UL (ref 150–450)
POTASSIUM SERPL-SCNC: 4.1 MMOL/L (ref 3.5–5.3)
PROT SERPL-MCNC: 7.2 G/DL (ref 6.4–8.2)
RBC # BLD AUTO: 4.41 X10*6/UL (ref 4.5–5.9)
SODIUM SERPL-SCNC: 133 MMOL/L (ref 136–145)
WBC # BLD AUTO: 4.5 X10*3/UL (ref 4.4–11.3)

## 2025-02-09 PROCEDURE — 93005 ELECTROCARDIOGRAM TRACING: CPT

## 2025-02-09 PROCEDURE — 85025 COMPLETE CBC W/AUTO DIFF WBC: CPT | Performed by: EMERGENCY MEDICINE

## 2025-02-09 PROCEDURE — 36415 COLL VENOUS BLD VENIPUNCTURE: CPT | Performed by: EMERGENCY MEDICINE

## 2025-02-09 PROCEDURE — 80053 COMPREHEN METABOLIC PANEL: CPT | Performed by: EMERGENCY MEDICINE

## 2025-02-09 PROCEDURE — 70450 CT HEAD/BRAIN W/O DYE: CPT | Performed by: RADIOLOGY

## 2025-02-09 PROCEDURE — 84484 ASSAY OF TROPONIN QUANT: CPT | Performed by: EMERGENCY MEDICINE

## 2025-02-09 PROCEDURE — 70450 CT HEAD/BRAIN W/O DYE: CPT

## 2025-02-09 PROCEDURE — 82248 BILIRUBIN DIRECT: CPT | Performed by: EMERGENCY MEDICINE

## 2025-02-09 PROCEDURE — 99285 EMERGENCY DEPT VISIT HI MDM: CPT | Mod: 25 | Performed by: EMERGENCY MEDICINE

## 2025-02-09 PROCEDURE — 83735 ASSAY OF MAGNESIUM: CPT | Performed by: EMERGENCY MEDICINE

## 2025-02-09 ASSESSMENT — COLUMBIA-SUICIDE SEVERITY RATING SCALE - C-SSRS
1. IN THE PAST MONTH, HAVE YOU WISHED YOU WERE DEAD OR WISHED YOU COULD GO TO SLEEP AND NOT WAKE UP?: NO
2. HAVE YOU ACTUALLY HAD ANY THOUGHTS OF KILLING YOURSELF?: NO
6. HAVE YOU EVER DONE ANYTHING, STARTED TO DO ANYTHING, OR PREPARED TO DO ANYTHING TO END YOUR LIFE?: NO

## 2025-02-09 NOTE — ED TRIAGE NOTES
Pt presents to the ED via EMS for dizziness that began this morning. Per pt he has been having high BP and heart monitor is showing afib. States he has chest palpitations when laying down. A&Ox4. Respirations even & unlabored.

## 2025-02-09 NOTE — ED PROVIDER NOTES
HPI   Chief Complaint   Patient presents with   • Dizziness       HPI: []  79-year-old white male history of hypertension, dyslipidemia comes in with dizziness.  He said this morning he woke up he was lightheaded.  Blood pressure was high.  No spinning.  Had some nausea with no vomiting.  No headache no double vision or blurred vision loss of pain, denies paresthesias numbness tingling weakness of the upper or lower extremities.  No loss of balance.  No disequilibrium.  He denies any chest pain pressure heaviness cough congestion incontinence seizures.  He denies any shortness of breath no PND apnea.  He has had multiple similar episode in the past had a Holter monitor placed also and is due to see a cardiologist soon.    Past history: Hypertension, dyslipidemia  Social: Patient denies current tobacco alcohol drug abuse.  REVIEW OF SYSTEMS:    GENERAL.: No weight loss, fatigue, anorexia, insomnia, fever.  Positive for dizziness/lightheadedness    EYES: No vision loss, double vision, drainage, eye pain.    ENT: No pharyngitis, dry mouth.    CARDIOPULMONARY: No chest pain, palpitations, syncope, near syncope. No shortness of breath, cough, hemoptysis.    GI: No abdominal pain, change in bowel habits, melena, hematemesis, hematochezia, nausea, vomiting, diarrhea.    : No discharge, dysuria, frequency, urgency, hematuria.    MS: No limb pain, joint pain, joint swelling.    SKIN: No rashes.    PSYCH: No depression, anxiety, suicidality, homicidality.    Review of systems is otherwise negative unless stated above or in history of present illness.  Social history, family history, allergies reviewed.  PHYSICAL EXAM:    GENERAL: Vitals noted, no distress. Alert and oriented  x 3. Non-toxic.    Negative temporal tenderness  EENT: TMs clear. Posterior oropharynx unremarkable. No meningismus. No LAD.   Eyes: Pupils equal round and reactive to light accommodation EOMs are intact negative nystagmus  NECK: Supple. Nontender. No  midline tenderness.     CARDIAC: Regular, rate, rhythm. No murmurs rubs or gallops. No JVD    PULMONARY: Lungs clear bilaterally with good aeration. No wheezes rales or rhonchi. No respiratory distress.     ABDOMEN: Soft, nonsurgical. Nontender. No peritoneal signs. Normoactive bowel sounds. No pulsatile masses.     EXTREMITIES: No peripheral edema. Negative Homans bilaterally, no cords.  2+ bounding pulse well-perfused.    SKIN: No rash. Intact.     NEURO: No focal neurologic deficits, NIH score of 0. Cranial nerves normal as tested from II through XII.  Negative nystagmus.  Finger-to-nose test negative, heel to knee to shin test negative, gait is normal no ataxia Romberg negative, test of skew negative.    MEDICAL DECISION MAKING:  EKG upon arrival on my interpretation shows a normal sinus rhythm normal axis rate mid 60s with no acute ischemic changes.  CBC with Danish, chemistry LFTs are unremarkable troponin x 2 is negative CT head is negative.    Treatment in the ED: IV established send cardiac monitor.    ED course: Patient remained asymptomatic remains normotensive no tachycardia hypoxia no loss of balance no disequilibrium stroke scale remains 0.    Impression: #1 dizziness/lightheadedness nonspecific  Plan/MDM: 79-year-old white male history of hypertension dyslipidemia comes in with theof lightheadedness.  Currently he is neurologic intact blood pressure downtrending not static neuro intact distal skew negative stroke scale 0 my suspicion for stroke TIA post recirculation ischemia is low.  Patient will be discharged home advised and outpatient follow with primary doctor and his cardiologist with strict return precaution.              Patient History   No past medical history on file.  No past surgical history on file.  No family history on file.  Social History     Tobacco Use   • Smoking status: Never   • Smokeless tobacco: Never   Substance Use Topics   • Alcohol use: Not on file   • Drug use: Not on  file       Physical Exam   ED Triage Vitals [02/09/25 0821]   Temperature Heart Rate Respirations BP   36.3 °C (97.3 °F) 69 16 178/86      Pulse Ox Temp Source Heart Rate Source Patient Position   96 % Temporal Monitor Lying      BP Location FiO2 (%)     Left arm --       Physical Exam      ED Course & [facility]   ED Course as of 02/09/25 1202   Sun Feb 09, 2025   1158 EKG has ischemic change, CT head negative, CBC with chemistry Lifteez are normal troponin x 2 is negative patient's neuroexam is normal stroke scale is 0 chest x-ray negative, patient be discharged home with an outpatient follow-up [MT]      ED Course User Index  [MT] Davie Jimenez MD         Diagnoses as of 02/09/25 1202   Dizziness   Lightheadedness                 No data recorded     B[place]on Coma Scale Score: 15 (02/09/25 0821 : Charisse Renae RN)                           Medical Decision Making      Procedure  Procedures     Davie Jimenez MD  02/09/25 1202

## 2025-02-10 LAB
ATRIAL RATE: 64 BPM
P AXIS: 47 DEGREES
P OFFSET: 145 MS
P ONSET: 114 MS
PR INTERVAL: 214 MS
Q ONSET: 221 MS
QRS COUNT: 10 BEATS
QRS DURATION: 88 MS
QT INTERVAL: 380 MS
QTC CALCULATION(BAZETT): 392 MS
QTC FREDERICIA: 388 MS
R AXIS: -22 DEGREES
T AXIS: 8 DEGREES
T OFFSET: 411 MS
VENTRICULAR RATE: 64 BPM
